# Patient Record
Sex: FEMALE | Race: ASIAN | NOT HISPANIC OR LATINO
[De-identification: names, ages, dates, MRNs, and addresses within clinical notes are randomized per-mention and may not be internally consistent; named-entity substitution may affect disease eponyms.]

---

## 2021-02-22 ENCOUNTER — DASHBOARD ENCOUNTERS (OUTPATIENT)
Age: 71
End: 2021-02-22

## 2021-02-22 ENCOUNTER — OFFICE VISIT (OUTPATIENT)
Dept: URBAN - METROPOLITAN AREA CLINIC 78 | Facility: CLINIC | Age: 71
End: 2021-02-22
Payer: MEDICARE

## 2021-02-22 VITALS
DIASTOLIC BLOOD PRESSURE: 82 MMHG | WEIGHT: 170.8 LBS | BODY MASS INDEX: 31.43 KG/M2 | SYSTOLIC BLOOD PRESSURE: 158 MMHG | HEIGHT: 62 IN | RESPIRATION RATE: 16 BRPM | TEMPERATURE: 97 F | HEART RATE: 83 BPM

## 2021-02-22 DIAGNOSIS — R10.12 LEFT UPPER QUADRANT ABDOMINAL PAIN: ICD-10-CM

## 2021-02-22 DIAGNOSIS — K64.4 EXTERNAL HEMORRHOIDS WITH COMPLICATION: ICD-10-CM

## 2021-02-22 DIAGNOSIS — R19.8 DEFECATION SYMPTOM: ICD-10-CM

## 2021-02-22 DIAGNOSIS — K62.5 RECTAL BLEEDING: ICD-10-CM

## 2021-02-22 DIAGNOSIS — K57.90 DIVERTICULOSIS: ICD-10-CM

## 2021-02-22 PROBLEM — 363478007 THYROID CANCER: Status: ACTIVE | Noted: 2021-02-22

## 2021-02-22 PROBLEM — 397881000: Status: ACTIVE | Noted: 2021-02-22

## 2021-02-22 PROCEDURE — 99203 OFFICE O/P NEW LOW 30 MIN: CPT | Performed by: INTERNAL MEDICINE

## 2021-02-22 NOTE — HPI-TODAY'S VISIT:
Patient was referred by Dr. Loree Medina  A copy of this document will be sent to the physician.  Personal hx of stage 0 Breast cancer s/p radiation and Tamoxifen  Patient has longstanding history of hemorrhoids . She is accompanied by her daughter . She would like to have the internal and external hemorrhoids removed . Hx of inflammed external hemorrhoids  She is eating more during CoVID and has inflammed hemorrhoids external  Denies significant constipation but some straining  Eats fruits or vegetable but no other supplemental fiber   She later on reports some mild discomfort in the left upper abdomen , more constant   Colonoscopy in 2008 by Dr Ochoa ( no polyps ) Colonoscopy in 2012 by Dr Ochoa ( no polyps )  Hemorrhoidal banding by Dr Raul escobedo in 2013  Colonoscopy in 2015 at [facility]   EGD in 2012 with path reviewed    SHe has had hemorrrhoid problems for many years   (+) discomfort  BRBPR in toilet and dripping  in toilet   Straining with stools but infreq BM

## 2025-03-28 ENCOUNTER — OFFICE VISIT (OUTPATIENT)
Dept: URBAN - METROPOLITAN AREA CLINIC 78 | Facility: CLINIC | Age: 75
End: 2025-03-28
Payer: COMMERCIAL

## 2025-03-28 DIAGNOSIS — K59.09 CHRONIC CONSTIPATION: ICD-10-CM

## 2025-03-28 DIAGNOSIS — K57.90 DIVERTICULOSIS: ICD-10-CM

## 2025-03-28 DIAGNOSIS — Z12.11 SCREENING FOR COLON CANCER: ICD-10-CM

## 2025-03-28 DIAGNOSIS — R10.821 RIGHT UPPER QUADRANT ABDOMINAL TENDERNESS WITH REBOUND TENDERNESS: ICD-10-CM

## 2025-03-28 DIAGNOSIS — R10.13 DYSPEPSIA: ICD-10-CM

## 2025-03-28 PROCEDURE — 99204 OFFICE O/P NEW MOD 45 MIN: CPT | Performed by: INTERNAL MEDICINE

## 2025-03-28 RX ORDER — LOSARTAN POTASSIUM 25 MG/1
TAKE ONE TABLET BY MOUTH ONE TIME DAILY TABLET, FILM COATED ORAL
Qty: 90 UNSPECIFIED | Refills: 1 | Status: ACTIVE | COMMUNITY

## 2025-03-28 RX ORDER — LEVOTHYROXINE SODIUM 100 UG/1
TAKE ONE TABLET BY MOUTH ONE TIME DAILY TABLET ORAL
Qty: 90 UNSPECIFIED | Refills: 1 | Status: ACTIVE | COMMUNITY

## 2025-03-28 RX ORDER — PANTOPRAZOLE SODIUM 40 MG/1
1 TABLET TABLET, DELAYED RELEASE ORAL ONCE A DAY
Qty: 90 | Refills: 0 | OUTPATIENT
Start: 2025-03-28

## 2025-03-28 NOTE — PHYSICAL EXAM GASTROINTESTINAL
Abdomen , soft, nontender, nondistended , no guarding or rigidity , no masses palpable , normal bowel sounds , Liver and Spleen , no hepatomegaly present , liver nontender , spleen not palpable
locker 3/Jewelry/Money (specify)/Other belongings/Clothing

## 2025-03-28 NOTE — HPI-TODAY'S VISIT:
Patient was referred by Dr. Loree Medina  A copy of this document will be sent to the physician.  - Reason for consultation: GI issues/stomach issues - Chief complaint: Discomfort after eating - Duration: Couple of weeks - Onset: Within 5-10 minutes of eating - Location: Variable, including upper and lower abdomen - Severity: 2-3/10 - Bowel habits: Harder stool at the beginning of bowel movements, with regular consistency at the end - Diet changes: Increased fiber intake to maintain A1C levels - Exercise: Increased - Hydration: Not drinking enough water - Medical history: Previously prediabetic, now A1C under control with diet and exercise - Last visit: 2021 - Colonoscopy history: 2008 (no polyps), 2013, 2015 - Cardiovascular symptoms: Feeling of heart accelerating within an hour after eating - Blood pressure: Erratic high readings - Heart: Complaints of racing heart - Previous diagnoses: Hemorrhoids, status post hemorrhoidectomy  - Abdominal examination: Tenderness in the right upper quadrant, under the rib cage - No compaction noted on the left side - Investigations: - Last colonoscopy: 2015 - Due for repeat colonoscopy  Personal hx of stage 0 Breast cancer s/p radiation and Tamoxifen  Patient has longstanding history of hemorrhoids . She is accompanied by her daughter . She would like to have the internal and external hemorrhoids removed . Hx of inflammed external hemorrhoids  She is eating more during CoVID and has inflammed hemorrhoids external  Denies significant constipation but some straining  Eats fruits or vegetable but no other supplemental fiber   She later on reports some mild discomfort in the left upper abdomen , more constant   Colonoscopy in 2008 by Dr Ochoa ( no polyps ) Colonoscopy in 2012 by Dr Ochoa ( no polyps )  Hemorrhoidal banding by Dr Raul escobedo in 2013  Colonoscopy in 2015 at [facility]   EGD in 2012 with path reviewed    SHe has had hemorrrhoid problems for many years   (+) discomfort  BRBPR in toilet and dripping  in toilet   Straining with stools but infreq BM

## 2025-07-08 ENCOUNTER — TELEPHONE ENCOUNTER (OUTPATIENT)
Dept: URBAN - METROPOLITAN AREA CLINIC 78 | Facility: CLINIC | Age: 75
End: 2025-07-08

## 2025-07-08 RX ORDER — PANTOPRAZOLE SODIUM 40 MG/1
1 TABLET TABLET, DELAYED RELEASE ORAL ONCE A DAY
Qty: 90 | Refills: 0
Start: 2025-03-28